# Patient Record
Sex: FEMALE | Race: WHITE | Employment: OTHER | URBAN - METROPOLITAN AREA
[De-identification: names, ages, dates, MRNs, and addresses within clinical notes are randomized per-mention and may not be internally consistent; named-entity substitution may affect disease eponyms.]

---

## 2017-08-14 ENCOUNTER — HOSPITAL ENCOUNTER (OUTPATIENT)
Dept: GENERAL RADIOLOGY | Age: 82
Discharge: HOME | End: 2017-08-14
Attending: INTERNAL MEDICINE | Admitting: INTERNAL MEDICINE

## 2017-08-14 ENCOUNTER — OFFICE VISIT (OUTPATIENT)
Age: 82
End: 2017-08-14

## 2017-08-14 VITALS
HEIGHT: 60 IN | DIASTOLIC BLOOD PRESSURE: 68 MMHG | SYSTOLIC BLOOD PRESSURE: 134 MMHG | BODY MASS INDEX: 21.09 KG/M2 | WEIGHT: 107.4 LBS

## 2017-08-14 DIAGNOSIS — E55.9 VITAMIN D DEFICIENCY: Chronic | ICD-10-CM

## 2017-08-14 DIAGNOSIS — N18.3 CHRONIC KIDNEY DISEASE (CKD), STAGE 3 (MODERATE): Chronic | ICD-10-CM

## 2017-08-14 DIAGNOSIS — Z51.81 MEDICATION MONITORING ENCOUNTER: Chronic | ICD-10-CM

## 2017-08-14 DIAGNOSIS — M81.0 OSTEOPOROSIS, POSTMENOPAUSAL: Chronic | ICD-10-CM

## 2017-08-14 DIAGNOSIS — M81.0 OSTEOPOROSIS, POSTMENOPAUSAL: Primary | Chronic | ICD-10-CM

## 2017-08-14 PROCEDURE — 77080 DXA BONE DENSITY AXIAL: CPT | Performed by: INTERNAL MEDICINE

## 2017-08-14 PROCEDURE — 99214 OFFICE O/P EST MOD 30 MIN: CPT | Performed by: INTERNAL MEDICINE

## 2017-08-14 RX ORDER — ALENDRONATE SODIUM 70 MG/1
70 TABLET ORAL WEEKLY
Qty: 12 TABLET | Refills: 4 | Status: SHIPPED | OUTPATIENT
Start: 2017-08-14 | End: 2018-08-04 | Stop reason: SDUPTHER

## 2017-08-23 ENCOUNTER — PROCEDURE VISIT (OUTPATIENT)
Age: 82
End: 2017-08-23

## 2017-08-23 DIAGNOSIS — M81.0 OSTEOPOROSIS, POSTMENOPAUSAL: Primary | Chronic | ICD-10-CM

## 2017-10-30 ENCOUNTER — TELEPHONE (OUTPATIENT)
Dept: ENDOCRINOLOGY | Age: 82
End: 2017-10-30

## 2018-08-06 RX ORDER — ALENDRONATE SODIUM 70 MG/1
TABLET ORAL
Qty: 4 TABLET | Refills: 3 | Status: SHIPPED | OUTPATIENT
Start: 2018-08-06 | End: 2018-08-27 | Stop reason: SDUPTHER

## 2018-08-27 ENCOUNTER — OFFICE VISIT (OUTPATIENT)
Age: 83
End: 2018-08-27

## 2018-08-27 ENCOUNTER — PROCEDURE VISIT (OUTPATIENT)
Age: 83
End: 2018-08-27

## 2018-08-27 ENCOUNTER — HOSPITAL ENCOUNTER (OUTPATIENT)
Dept: GENERAL RADIOLOGY | Age: 83
Discharge: HOME | End: 2018-08-27
Payer: MEDICARE

## 2018-08-27 VITALS
SYSTOLIC BLOOD PRESSURE: 115 MMHG | HEIGHT: 60 IN | BODY MASS INDEX: 21.68 KG/M2 | DIASTOLIC BLOOD PRESSURE: 63 MMHG | WEIGHT: 110.4 LBS

## 2018-08-27 DIAGNOSIS — N18.30 STAGE 3 CHRONIC KIDNEY DISEASE (HCC): Chronic | ICD-10-CM

## 2018-08-27 DIAGNOSIS — M81.0 OSTEOPOROSIS, POSTMENOPAUSAL: Primary | Chronic | ICD-10-CM

## 2018-08-27 DIAGNOSIS — E55.9 VITAMIN D DEFICIENCY: Chronic | ICD-10-CM

## 2018-08-27 DIAGNOSIS — Z51.81 MEDICATION MONITORING ENCOUNTER: Chronic | ICD-10-CM

## 2018-08-27 DIAGNOSIS — M81.0 OSTEOPOROSIS, POSTMENOPAUSAL: Chronic | ICD-10-CM

## 2018-08-27 PROCEDURE — 77080 DXA BONE DENSITY AXIAL: CPT

## 2018-08-27 PROCEDURE — 77080 DXA BONE DENSITY AXIAL: CPT | Performed by: INTERNAL MEDICINE

## 2018-08-27 PROCEDURE — 99214 OFFICE O/P EST MOD 30 MIN: CPT | Performed by: INTERNAL MEDICINE

## 2018-08-27 RX ORDER — AMOXICILLIN 500 MG/1
500 CAPSULE ORAL 3 TIMES DAILY
COMMUNITY
End: 2020-04-22

## 2018-08-27 RX ORDER — TRIAMTERENE AND HYDROCHLOROTHIAZIDE 37.5; 25 MG/1; MG/1
1 TABLET ORAL DAILY
COMMUNITY
End: 2020-05-03

## 2018-08-27 RX ORDER — ALENDRONATE SODIUM 70 MG/1
TABLET ORAL
Qty: 12 TABLET | Refills: 4 | Status: SHIPPED | OUTPATIENT
Start: 2018-08-27 | End: 2018-09-10 | Stop reason: SDUPTHER

## 2018-08-27 RX ORDER — AMLODIPINE BESYLATE 2.5 MG/1
2.5 TABLET ORAL PRN
COMMUNITY
End: 2020-04-22

## 2018-08-27 NOTE — PROGRESS NOTES
[facility] ([facility]) Osteoporosis and [address]

## 2018-08-27 NOTE — PROGRESS NOTES
details. Kasie Mercado MD, Director, [facility]) Osteoporosis and Bone Health Services    CC: Lonza Bence DO

## 2018-09-10 ENCOUNTER — TELEPHONE (OUTPATIENT)
Dept: ENDOCRINOLOGY | Age: 83
End: 2018-09-10

## 2018-09-10 RX ORDER — ALENDRONATE SODIUM 70 MG/1
TABLET ORAL
Qty: 12 TABLET | Refills: 4 | Status: SHIPPED | OUTPATIENT
Start: 2018-09-10 | End: 2019-09-03 | Stop reason: SDUPTHER

## 2019-09-03 ENCOUNTER — HOSPITAL ENCOUNTER (OUTPATIENT)
Dept: GENERAL RADIOLOGY | Age: 84
Discharge: HOME | End: 2019-09-03
Payer: MEDICARE

## 2019-09-03 ENCOUNTER — PROCEDURE VISIT (OUTPATIENT)
Dept: ENDOCRINOLOGY | Age: 84
End: 2019-09-03
Payer: MEDICARE

## 2019-09-03 ENCOUNTER — OFFICE VISIT (OUTPATIENT)
Dept: ENDOCRINOLOGY | Age: 84
End: 2019-09-03
Payer: MEDICARE

## 2019-09-03 VITALS
SYSTOLIC BLOOD PRESSURE: 130 MMHG | DIASTOLIC BLOOD PRESSURE: 63 MMHG | BODY MASS INDEX: 22.54 KG/M2 | WEIGHT: 114.8 LBS | HEIGHT: 60 IN

## 2019-09-03 DIAGNOSIS — Z51.81 MEDICATION MONITORING ENCOUNTER: ICD-10-CM

## 2019-09-03 DIAGNOSIS — N18.30 STAGE 3 CHRONIC KIDNEY DISEASE (HCC): ICD-10-CM

## 2019-09-03 DIAGNOSIS — E55.9 VITAMIN D DEFICIENCY: ICD-10-CM

## 2019-09-03 DIAGNOSIS — M81.0 OSTEOPOROSIS, POSTMENOPAUSAL: Primary | ICD-10-CM

## 2019-09-03 DIAGNOSIS — M81.0 OSTEOPOROSIS, POSTMENOPAUSAL: Chronic | ICD-10-CM

## 2019-09-03 DIAGNOSIS — M81.0 OSTEOPOROSIS, POSTMENOPAUSAL: ICD-10-CM

## 2019-09-03 PROCEDURE — 77080 DXA BONE DENSITY AXIAL: CPT

## 2019-09-03 PROCEDURE — 77080 DXA BONE DENSITY AXIAL: CPT | Performed by: INTERNAL MEDICINE

## 2019-09-03 PROCEDURE — 99214 OFFICE O/P EST MOD 30 MIN: CPT | Performed by: INTERNAL MEDICINE

## 2019-09-03 RX ORDER — ALENDRONATE SODIUM 70 MG/1
TABLET ORAL
Qty: 12 TABLET | Refills: 4 | Status: SHIPPED | OUTPATIENT
Start: 2019-09-03 | End: 2020-04-22

## 2020-04-22 ENCOUNTER — OFFICE VISIT (OUTPATIENT)
Dept: ORTHOPEDIC SURGERY | Age: 85
End: 2020-04-22
Payer: MEDICARE

## 2020-04-22 VITALS
BODY MASS INDEX: 20.98 KG/M2 | SYSTOLIC BLOOD PRESSURE: 120 MMHG | DIASTOLIC BLOOD PRESSURE: 60 MMHG | WEIGHT: 114 LBS | HEART RATE: 65 BPM | HEIGHT: 62 IN

## 2020-04-22 PROCEDURE — 99204 OFFICE O/P NEW MOD 45 MIN: CPT | Performed by: PHYSICIAN ASSISTANT

## 2020-04-22 RX ORDER — TIZANIDINE 4 MG/1
4 TABLET ORAL NIGHTLY
Qty: 30 TABLET | Refills: 0 | Status: SHIPPED | OUTPATIENT
Start: 2020-04-22 | End: 2020-05-22

## 2020-04-22 NOTE — PROGRESS NOTES
New Patient: SPINE    Referring Provider:  No ref. provider found    Chief Complaint   Patient presents with    Lower Back Pain     OP/SP, LSP.  LOV 5/2/16    Leg Pain     Bilateral leg pain, today right more than left    Hip Pain     Bilateral hip pain, today right more than left       HISTORY OF PRESENT ILLNESS:      · The patient is being sent at the request of No ref. provider found in consultation as a new spine patient for low back pain and bilateral leg pain. The patient is a 81 y.o. female whom reports symptoms for 3 weeks. Symptoms progressed over the last  10 days. Patient reports there was not a significant event to cause the symptoms. Today discomfort is report at 4 out of 10, describing it as aching. Symptoms are aggravated by: lying down, sleeping. Patient has not undergone recent treatment. Patient denies previous lumbar spine surgery. · The patient presents today as a old patient with the same problem of lower back and bilateral leg pain. The patient was last seen in our office on 5/2/2016. The patient describes that her pain is in the lower back and radiates down the bilateral legs into the calf. This is been continually worsening over the past 3 weeks and she notices that a majority of her pain is worse at night which is something that she has not ever experienced before she has had ongoing lower back and leg symptoms for a number of years. The patient has tried some physical therapy but she has not attempted any other medications. She does have Tylenol at home that she would like to start taking during the day. Pain Assessment  Location of Pain: Back(LSP)  Location Modifiers: Posterior, Left, Right(Legs/Hips)  Severity of Pain: 4  Quality of Pain: Aching  Duration of Pain: Persistent  Frequency of Pain: Intermittent  Aggravating Factors: Other (Comment)(Lying down; sleeping)  Limiting Behavior: Yes  Relieving Factors:  Other (Comment)(Movement)  Result of Injury: No  Work-Related Injury: No  Are there other pain locations you wish to document?: No      Associated signs and symptoms:   Neurogenic bowel or bladder symptoms:  no   Perceived weakness:  no   Difficulty walking:  no    Recent Imaging (within past one year)   Xrays: no   MRI or CT of spine: no    Current/Past Treatment:   · Physical Therapy:  none  · Chiropractic:  none  · Injection:  none  · Medications:   NSAIDS:  none   Muscle relaxer:  none   Steriods:  none   Neuropathic medications:  none   Opioids:  none  · Previous surgery:  no  · Previous surgical consult:  no  · Other:  · Infection control  · Tested positive for MRSA in past 12 months:  no  · Tested positive for MSSA \"staph infection\" in past 12 months: no  · Tested positive for VRE (Vancomycin Resistant Enterococci) in past 12 months:   no  · Currently on any antibiotics for an infection: no  · Anticoagulants:  · On a blood thinner:  yes ASA  · Any history of bleeding disorder: no   · MRI Contraindication: no   · Previous Pain Management: yes   · Goal for treatment: Continue to walk  · How long can you stand? No limitations    Sit? No limitations       Walk? 2-3 miles      Past medical, surgical, social and family history reviewed with the patient.      Past Medical History:   Past Medical History:   Diagnosis Date    Acid reflux     High blood pressure     Lactose intolerance     Mitral valve prolapse     Seasonal allergies       Past Surgical History:     Past Surgical History:   Procedure Laterality Date    GALLBLADDER SURGERY      HYSTERECTOMY  1994    OTHER SURGICAL HISTORY      Uterine mess placement    UTERINE FIBROID SURGERY       Current Medications:     Current Outpatient Medications:     UNABLE TO FIND, Antistamine 10mg, Disp: , Rfl:     tiZANidine (ZANAFLEX) 4 MG tablet, Take 1 tablet by mouth nightly, Disp: 30 tablet, Rfl: 0    triamterene-hydrochlorothiazide (MAXZIDE-25) 37.5-25 MG per tablet, Take 1 tablet by mouth daily, Disp: , Rfl:    Probiotic Product (ACIDOPHILUS PROBIOTIC) CAPS capsule, Take 1 capsule by mouth daily. , Disp: , Rfl:     omeprazole (PRILOSEC) 10 MG capsule, Take 10 mg by mouth daily. , Disp: , Rfl:     aspirin 81 MG chewable tablet, Take 81 mg by mouth daily. , Disp: , Rfl:     Calcium Carbonate-Vit D-Min (CALCIUM 1200) 5636-8360 MG-UNIT CHEW, Take  by mouth daily. , Disp: , Rfl:     Vitamin D (CHOLECALCIFEROL) 1000 UNITS CAPS capsule, Take 1,000 Units by mouth daily. , Disp: , Rfl:   Allergies:  Lactose intolerance (gi) [lactose]; Morphine and related [codeine]; and Other  Social History:    reports that she has never smoked. She has never used smokeless tobacco. She reports that she does not drink alcohol or use drugs. Family History:   Family History   Problem Relation Age of Onset    No Known Problems Mother     No Known Problems Father     No Known Problems Sister          REVIEW OF SYSTEMS: ROS - 14 point    Constitutional: No fevers, chills, night sweats, unexplained weight loss  Eye: No vision changes or diplopia  ENT: No nasal congestion, postnasal drip or sore throat. No tinnitus  Respiratory: No cough or SOB  CV: No chest pain or palpitations  GI: No nausea, abdominal pain, stool changes  : No dysuria or hematuria  Skin: No new or changing skin lesions, no rashes  MSK: No joint swelling, morning stiffness, unusual joint pain  Neurological: No headache, confusion, syncope  Psychiatric: No excessive anxiety or depression  Endocrine: No polyuria or polydipsia  Hematologic: No lymph node enlargement or excessive bleeding  Immunologic:No history of immune deficiency or immunomodulating drugs         PHYSICAL EXAM:    Vitals: Blood pressure 120/60, pulse 65, height 5' 1.5\" (1.562 m), weight 114 lb (51.7 kg). GENERAL EXAM:  · General Apparence: Patient is adequately groomed with no evidence of malnutrition. · Psychiatric: Orientation: The patient is oriented to time, place and person.  The patient's mood and affect are appropriate   · Vascular: Examination reveals no swelling and palpation reveals no tenderness in upper or lower extremities. Good capillary refill. · The lymphatic examination of the neck, axillae and groin reveals all areas to be without enlargement or induration   Sensation is intact without deficit in the upper and lower extremities to light touch and pinprick  · Coordination of the upper and lower extremities are normal.    CERVICAL EXAMINATION:  · Inspection: Local inspection shows no step-off or bruising. Cervical alignment is normal. No instability is noted. · Palpation and Percussion: No evidence of tenderness at the midline. Paraspinal tenderness is not present. There is no paraspinal spasm. · Range of Motion:  pain-free ROM   · Strength: 5/5 bilateral upper extremities  · Special Tests:   Spurling's and Gallardo's are negative bilaterally. Perez and Impingement tests are negative bilaterally. · Skin:There are no rashes, ulcerations or lesions. · Reflexes: Bilaterally triceps, biceps and brachioradialis are 2+. Clonus absent bilaterally at the feet. No pathological reflexes are noted. · Gait & station:  normal, patient ambulates without assistance and no ataxia  · Additional Examinations:  · RIGHT UPPER EXTREMITY:  Inspection/examination of the right upper extremity does not show any tenderness, deformity or injury. Range of motion is normal and pain-free. There is no gross instability. There are no rashes, ulcerations or lesions. Strength and tone are normal. No atrophy or abnormal movements are noted. · LEFT UPPER EXTREMITY: Inspection/examination of the left upper extremity does not show any tenderness, deformity or injury. Range of motion is normal and pain-free. There is no gross instability. There are no rashes, ulcerations or lesions. Strength and tone are normal. No atrophy or abnormal movements are noted.     LUMBAR/SACRAL EXAMINATION:  · Inspection: Local inspection Health       This dictation was performed with a verbal recognition program [facility]) and it was checked for errors. It is possible that there are still dictated errors within this office note. If so, please bring any errors to my attention for an addendum. All efforts were made to ensure that this office note is accurate.

## 2020-05-03 ENCOUNTER — HOSPITAL ENCOUNTER (EMERGENCY)
Age: 85
Discharge: HOME | End: 2020-05-03
Attending: EMERGENCY MEDICINE
Payer: MEDICARE

## 2020-05-03 VITALS
WEIGHT: 115 LBS | OXYGEN SATURATION: 97 % | HEART RATE: 59 BPM | HEIGHT: 60 IN | DIASTOLIC BLOOD PRESSURE: 52 MMHG | TEMPERATURE: 98.2 F | SYSTOLIC BLOOD PRESSURE: 138 MMHG | RESPIRATION RATE: 16 BRPM | BODY MASS INDEX: 22.58 KG/M2

## 2020-05-03 LAB
ALBUMIN SERPL-MCNC: 4 G/DL (ref 3.4–5)
ALP BLD-CCNC: 61 U/L (ref 40–129)
ALT SERPL-CCNC: 25 U/L (ref 10–40)
ANION GAP SERPL CALCULATED.3IONS-SCNC: 13 MMOL/L (ref 3–16)
AST SERPL-CCNC: 31 U/L (ref 15–37)
BASOPHILS ABSOLUTE: 0 K/UL (ref 0–0.2)
BASOPHILS RELATIVE PERCENT: 0.3 %
BILIRUB SERPL-MCNC: 0.3 MG/DL (ref 0–1)
BILIRUBIN DIRECT: <0.2 MG/DL (ref 0–0.3)
BILIRUBIN URINE: NEGATIVE
BILIRUBIN, INDIRECT: NORMAL MG/DL (ref 0–1)
BLOOD, URINE: NEGATIVE
BUN BLDV-MCNC: 17 MG/DL (ref 7–20)
CALCIUM SERPL-MCNC: 9.6 MG/DL (ref 8.3–10.6)
CHLORIDE BLD-SCNC: 104 MMOL/L (ref 99–110)
CLARITY: CLEAR
CO2: 23 MMOL/L (ref 21–32)
COLOR: YELLOW
CREAT SERPL-MCNC: 0.9 MG/DL (ref 0.6–1.2)
EOSINOPHILS ABSOLUTE: 0.1 K/UL (ref 0–0.6)
EOSINOPHILS RELATIVE PERCENT: 1.5 %
GFR AFRICAN AMERICAN: >60
GFR NON-AFRICAN AMERICAN: 60
GLUCOSE BLD-MCNC: 139 MG/DL (ref 70–99)
GLUCOSE URINE: NEGATIVE MG/DL
HCT VFR BLD CALC: 38.6 % (ref 36–48)
HEMOGLOBIN: 13.1 G/DL (ref 12–16)
KETONES, URINE: NEGATIVE MG/DL
LEUKOCYTE ESTERASE, URINE: ABNORMAL
LIPASE: 80 U/L (ref 13–60)
LYMPHOCYTES ABSOLUTE: 1.8 K/UL (ref 1–5.1)
LYMPHOCYTES RELATIVE PERCENT: 30 %
MCH RBC QN AUTO: 31.5 PG (ref 26–34)
MCHC RBC AUTO-ENTMCNC: 33.8 G/DL (ref 31–36)
MCV RBC AUTO: 93.2 FL (ref 80–100)
MICROSCOPIC EXAMINATION: YES
MONOCYTES ABSOLUTE: 0.5 K/UL (ref 0–1.3)
MONOCYTES RELATIVE PERCENT: 8.3 %
NEUTROPHILS ABSOLUTE: 3.7 K/UL (ref 1.7–7.7)
NEUTROPHILS RELATIVE PERCENT: 59.9 %
NITRITE, URINE: NEGATIVE
PDW BLD-RTO: 13.4 % (ref 12.4–15.4)
PH UA: 6 (ref 5–8)
PLATELET # BLD: 220 K/UL (ref 135–450)
PMV BLD AUTO: 8.6 FL (ref 5–10.5)
POTASSIUM REFLEX MAGNESIUM: 4 MMOL/L (ref 3.5–5.1)
PROTEIN UA: NEGATIVE MG/DL
RBC # BLD: 4.14 M/UL (ref 4–5.2)
RBC UA: NORMAL /HPF (ref 0–4)
SODIUM BLD-SCNC: 140 MMOL/L (ref 136–145)
SPECIFIC GRAVITY UA: 1.02 (ref 1–1.03)
TOTAL PROTEIN: 7 G/DL (ref 6.4–8.2)
URINE TYPE: ABNORMAL
UROBILINOGEN, URINE: 0.2 E.U./DL
WBC # BLD: 6.1 K/UL (ref 4–11)
WBC UA: NORMAL /HPF (ref 0–5)

## 2020-05-03 PROCEDURE — 80048 BASIC METABOLIC PNL TOTAL CA: CPT

## 2020-05-03 PROCEDURE — 85025 COMPLETE CBC W/AUTO DIFF WBC: CPT

## 2020-05-03 PROCEDURE — 99284 EMERGENCY DEPT VISIT MOD MDM: CPT

## 2020-05-03 PROCEDURE — 36415 COLL VENOUS BLD VENIPUNCTURE: CPT

## 2020-05-03 PROCEDURE — 80076 HEPATIC FUNCTION PANEL: CPT

## 2020-05-03 PROCEDURE — 81001 URINALYSIS AUTO W/SCOPE: CPT

## 2020-05-03 PROCEDURE — 83690 ASSAY OF LIPASE: CPT

## 2020-05-03 RX ORDER — IRBESARTAN 150 MG/1
150 TABLET ORAL DAILY
COMMUNITY

## 2020-05-03 RX ORDER — DICYCLOMINE HCL 20 MG
20 TABLET ORAL 3 TIMES DAILY PRN
Qty: 30 TABLET | Refills: 0 | Status: SHIPPED | OUTPATIENT
Start: 2020-05-03 | End: 2020-05-13

## 2020-05-03 ASSESSMENT — ENCOUNTER SYMPTOMS
DIARRHEA: 0
VOMITING: 0
NAUSEA: 0
CONSTIPATION: 0
BLOOD IN STOOL: 0
SHORTNESS OF BREATH: 0
ABDOMINAL PAIN: 1

## 2020-05-03 ASSESSMENT — PAIN SCALES - GENERAL: PAINLEVEL_OUTOF10: 6

## 2020-05-03 ASSESSMENT — PAIN DESCRIPTION - LOCATION: LOCATION: ABDOMEN

## 2020-05-03 ASSESSMENT — PAIN DESCRIPTION - PAIN TYPE: TYPE: ACUTE PAIN

## 2020-05-03 ASSESSMENT — PAIN DESCRIPTION - DESCRIPTORS: DESCRIPTORS: SHARP;SHOOTING

## 2020-05-03 ASSESSMENT — PAIN DESCRIPTION - ORIENTATION: ORIENTATION: LEFT;LOWER

## 2020-05-03 NOTE — ED PROVIDER NOTES
MG CAPSULE    Take 10 mg by mouth daily. PROBIOTIC PRODUCT (ACIDOPHILUS PROBIOTIC) CAPS CAPSULE    Take 1 capsule by mouth daily. TIZANIDINE (ZANAFLEX) 4 MG TABLET    Take 1 tablet by mouth nightly    UNABLE TO FIND    Antistamine 10mg    VITAMIN D (CHOLECALCIFEROL) 1000 UNITS CAPS CAPSULE    Take 1,000 Units by mouth daily. Allergies     She is allergic to demerol hcl [meperidine]; lactose intolerance (gi) [lactose]; morphine and related [codeine]; other; and prednisone. Physical Exam     INITIAL VITALS: BP: (!) 158/76, Temp: 98.2 °F (36.8 °C), Pulse: 59, Resp: 16, SpO2: 96 %    General: 81 y.o. female in no apparent distress, well developed, well nourished, non-toxic appearance. HEENT: Atraumatic, normocephalic. EOMs intact. Neck:  Full range of motion. Chest/pulm: Respiratory rate normal. Speaks in complete sentences, no respiratory distress, lungs CTA bilaterally, no wheezes, rales, rhonchi. Cardiovascular: Heart rate normal. RRR, no murmurs, rubs, gallops appreciated. Abdomen: No gross distension. Soft, mild tenderness to palpation of left lower quadrant without rebound or guarding. No CVAT. : Deferred. Musculoskeletal: Ambulates without difficulty. No evident long bone or joint deformity. Negative straight leg raise bilaterally. No lower extremity edema. Neuro: A&O x 4. Normal speech without dysarthria or aphasia. Moves all extremities spontaneously and symmetrically. Movements normal without ataxia. Skin: Warm, dry. No obvious rashes, petechiae, or purpura. Psych: Appropriate mood and affect, normal interaction.      Diagnostic Results     RADIOLOGY:  No orders to display       LABS:   Results for orders placed or performed during the hospital encounter of 05/03/20   CBC Auto Differential   Result Value Ref Range    WBC 6.1 4.0 - 11.0 K/uL    RBC 4.14 4.00 - 5.20 M/uL    Hemoglobin 13.1 12.0 - 16.0 g/dL    Hematocrit 38.6 36.0 - 48.0 %    MCV 0 - 4 /HPF         RECENT VITALS:  BP: (!) 138/52, Temp: 98.2 °F (36.8 °C), Pulse: 59, Resp: 16, SpO2: 97 %       ED Course     Nursing Notes, Past Medical Hx,Past Surgical Hx, Social Hx, Allergies, and Family Hx were reviewed. The patient was given the following medications:  Orders Placed This Encounter   Medications    dicyclomine (BENTYL) 20 MG tablet     Sig: Take 1 tablet by mouth 3 times daily as needed (abdominal pain)     Dispense:  30 tablet     Refill:  0       CONSULTS:  None    MEDICAL DECISION MAKING / ASSESSMENT / Nita Kierra is a 81 y.o. female presenting with LLQ pain. On presentation, patient is well-appearing and in no acute distress. Patient is afebrile with stable VS.    No leukocytosis, anemia, electrolyte abnormality, renal dysfunction, or hepatic dysfunction noted. Lipase of 80 however this is not consistent with acute pancreatitis. Urinalysis negative for UTI. Patient is overall clinically well-appearing with a benign abdominal exam.  Diverticulitis is on the differential however I have very low suspicion for this that she has unremarkable labs, a benign belly, and denies any hematochezia. Patient has had hysterectomy and bilateral oophorectomy which makes a gynecologic source of her symptoms unlikely. I have low suspicion for any acute intra-abdominal pathology at this time. Patient does not merit any further evaluation or management in the emergency department at this time. Patient's symptoms appear musculoskeletal in nature. Patient requested something for pain so I provided her with a prescription for Bentyl to help alleviate her discomfort. I also included exercises to help strengthen her core. I instructed that she should follow-up with her primary care provider as she may benefit from physical therapy if her symptoms persist.    At this point in time, patient is stable for discharge.  Patient given strict return precautions as outlined in the AVS. Patient was agreeable and understanding to this plan of care. Prior to discharge, patient was ambulatory and PO tolerant. This patient was also evaluated by the attending physician. All care plans were discussed and agreed upon. Clinical Impression     1. Left lower quadrant abdominal pain    2.  Strain of abdominal muscle, initial encounter        Disposition     PATIENT REFERRED TO:  [id number]  [facility] [address] [phone]            DISCHARGE MEDICATIONS:  New Prescriptions    DICYCLOMINE (BENTYL) 20 MG TABLET    Take 1 tablet by mouth 3 times daily as needed (abdominal pain)       DISPOSITION Decision To Discharge 05/03/2020 03:54:47 PM       Ne Daniel PA-C  05/03/20 [id number]

## 2020-05-03 NOTE — ED NOTES
Patient discharged to home with all belongings. All discharge and follow up information discussed. Patient verbalized understanding and denies needs or questions. Patient is alert, oriented x4, no signs of acute distress.       John Hayes RN  05/03/20 [id number]

## 2020-09-15 ENCOUNTER — PROCEDURE VISIT (OUTPATIENT)
Dept: ENDOCRINOLOGY | Age: 85
End: 2020-09-15

## 2020-09-15 ENCOUNTER — HOSPITAL ENCOUNTER (OUTPATIENT)
Dept: GENERAL RADIOLOGY | Age: 85
Discharge: HOME | End: 2020-09-15
Payer: MEDICARE

## 2020-09-15 ENCOUNTER — OFFICE VISIT (OUTPATIENT)
Dept: ENDOCRINOLOGY | Age: 85
End: 2020-09-15
Payer: MEDICARE

## 2020-09-15 VITALS
WEIGHT: 109 LBS | RESPIRATION RATE: 14 BRPM | HEART RATE: 57 BPM | HEIGHT: 60 IN | SYSTOLIC BLOOD PRESSURE: 152 MMHG | DIASTOLIC BLOOD PRESSURE: 72 MMHG | OXYGEN SATURATION: 98 % | BODY MASS INDEX: 21.4 KG/M2

## 2020-09-15 PROCEDURE — 77080 DXA BONE DENSITY AXIAL: CPT | Performed by: INTERNAL MEDICINE

## 2020-09-15 PROCEDURE — 99214 OFFICE O/P EST MOD 30 MIN: CPT | Performed by: INTERNAL MEDICINE

## 2020-09-15 PROCEDURE — 77080 DXA BONE DENSITY AXIAL: CPT

## 2020-09-15 RX ORDER — ALENDRONATE SODIUM 70 MG/1
70 TABLET ORAL
COMMUNITY
End: 2020-09-15 | Stop reason: SDUPTHER

## 2020-09-15 RX ORDER — MECLIZINE HCL 12.5 MG/1
12.5 TABLET ORAL 3 TIMES DAILY PRN
COMMUNITY
Start: 2020-08-17 | End: 2020-09-15 | Stop reason: ALTCHOICE

## 2020-09-15 RX ORDER — ONDANSETRON 4 MG/1
TABLET, FILM COATED ORAL
COMMUNITY
Start: 2020-08-17 | End: 2020-09-15 | Stop reason: ALTCHOICE

## 2020-09-15 RX ORDER — ALENDRONATE SODIUM 70 MG/1
70 TABLET ORAL
Qty: 4 TABLET | Refills: 4 | Status: SHIPPED | OUTPATIENT
Start: 2020-09-15 | End: 2021-05-20

## 2020-09-15 NOTE — PROGRESS NOTES
[facility]) Osteoporosis and [address]   Phone [phone]  Fax [phone]    PATIENT NAME: Noy Chamberlain OF BIRTH: 1935  INITIAL CONSULTATION: 06/09/2010  LAST OFFICE VISIT: 09/03/2019  TODAY'S VISIT: 09/15/2020    Labs @ [facility] 05/2020    PROBLEMS:   Osteoporosis by DXA, lowest T-score -2.5 in the spine 02/2010    Family history of osteoporosis (brother fractured hip)    BMD decreased 9332-4696  Vitamin D deficiency, desirable 25-OH D is 30 to 60 ng/mL    25 ng/mL 04/2010    67 ng/mL 06/2010 with ergocalciferol 50,000 IU weekly    42 ng/mL 10/2010 with cholecalciferol 2000 IU daily    36 ng/mL 10/2014 with cholecalciferol 2000 IU daily    76 ng/mL 05/2016    35 ng/mL 05/2020  Reduced kidney function, 03/2017 Cr 1.1 GFR 45. 06/2019 Cr 1.1 GFR 46. Surgical menopause 12 (age 48)  Bai's esophagus/GERD  Scoliosis  Osteoarthritis    CURRENT MANAGEMENT FOR BONE HEALTH:   Calcium:  900 mg diet + 600 mg/d supplement = 1500 mg/d  Vitamin D:  1000 IU 3 x weekly + 500 IU/d with calcium supplement   Exercise:  works in her garden and tries to walk daily  Pharmacologic therapy:  alendronate 70 mg weekly 11/2011-08/2016, stopped for a drug holiday, restarted 08/2017    PREVIOUS MEDICATIONS FOR OSTEOPOROSIS: Estrogen 2901-7370    OTHER CURRENT MEDICATIONS (SELECTED): Welchol    CHIEF COMPLAINT: Here for f/u visit of osteoporosis and vitamin D deficiency, monitoring treatment. No new related signs or symptoms. PAST MEDICAL HISTORY, FAMILY HISTORY, SOCIAL HISTORY AND REVIEW OF SYSTEMS:  Relevant changes since last visit (see patient questionnaire of todays date). INTERVAL HISTORY. See problem list for active/ongoing issues. She has been taking alendronate correctly and without side effects. No falls, near falls or fractures. She feels well overall. NEUROLOGIC EXAM: Able to rise from chair without using arms.   No apparent focal motor or sensory deficit. Reflexes brisk and symmetric. Coordination appears normal.  MUSCULOSKELETAL EXAM: Gait: Intact without difficulty. Steady without assistance. Spine: Scoliosis. No spine tenderness to palpation or percussion. Ribs and pelvis: Ribs appear normal. Two finger spaces between ribs and pelvis. BONE DENSITY: Most recent done here using Hologic equipment. T-scores  Initial study: 09/23/2003 L1-L4 -2.2 (left fem. neck) -1.9   Current study: 09/15/2020 L2-L3 -3.1 (left fem. neck) -2.2     The table below shows bone mineral density (grams/cm2), the appropriate measure for comparing serial scans. An increase or decrease is significant based on precision studies done at our center according to the ISCD protocol. PA spine Proximal Femur (left)   Date L2-L3 Fem. neck Trochanter Total hip   09/23/2003 --- 0.642 0.562 0.791   08/06/2010 --- 0.631 --- 0.798   11/14/2011 --- 0.579 0.452 0.680   12/04/2012 0.768 0.623 0.465 0.692   01/06/2015 0.752 0.607 0.485 0.713   07/26/2016 0.734 0.613 0.470 0.713   08/14/2017 0.683 0.606 0.468 0.700   08/27/2018 0.717 0.588 0.500 0.722   09/03/2019 0.682 0.603 0.475 0.700   09/15/2020 0.714 0.606 0.468 0.707     IMPRESSION:  BONE DENSITY IS LOW, CONSISTENT WITH OSTEOPOROSIS. SINCE THE PREVIOUS DXA, BMD DID NOT CHANGE SIGNIFICANTLY IN THE SPINE OR LEFT HIP. LABS. 05/2015 Ca 9.6 Cr 1.0 GFR 54.  04/2016 Ca 10.0 Cr 1.1 GFR 46. 03/2017 Ca 10.4 Cr 1.1 GFR 46.  06/2019 Ca 10.0, Cr 1.1 GFR 46. 05/2020 Ca 9.6 Cr 0.9. IMAGING. DXA printouts reviewed. ASSESSMENT: Osteoporosis, bone density lower than desirable. She had a nice response to alendronate 6320-3572. BMD decreased in the spine 7937-8880, off treatment for a drug holiday.   She is doing well with alendronate re-started 08/2017. PLANS:   Continue treatment with alendronate 70 mg weekly. Return in 1 year with DXA. I spent 25 minutes face to face with this patient.  Over 50% of that time was spent on counseling and care coordination. See assessment and plan for counseling and care coordination details. Andrea Mercado MD, Director, [facility] ([facility]) Osteoporosis and Bone Health Services    CC: Milly Young DO

## 2020-09-15 NOTE — RESULT ENCOUNTER NOTE
[facility]) Osteoporosis and [address] [place] [address]   Phone [phone]  Fax [phone]    NAME: Sergo Tang   : 1935   STUDY DATE: 09/15/2020     REFERRING PROVIDER: Lisbet Tabares MD     INDICATION(S) FOR PERFORMING THE STUDY:  osteoporosis, age-related (M81.0)    CLINICAL INFORMATION PROVIDED BY THE PATIENT: 81-year-old woman. She underwent surgical menopause at age 48. She took estrogen until . No history of fragility fractures. No long-term corticosteroid use. She took alendronate 2011-2016, restarted 2017. EQUIPMENT: Hologic [employer]. POSITIONING: Good. REGIONS OF INTEREST: Correct. ARTIFACTS: None. STUDY VALID? Yes. Spine BMD is spuriously high because of generalized degenerative change. L1 and L4 were deleted due to T-score discrepancy. T-scores  Initial study: 2003 L1-L4 -2.2 (left fem. neck) -1.9   Current study: 09/15/2020 L2-L3 -3.1 (left fem. neck) -2.2     The table below shows bone mineral density (grams/cm2), the appropriate measure for comparing serial scans. An increase or decrease is significant based on precision studies done at our center according to the ISCD protocol. PA spine Proximal Femur (left)   Date L2-L3 Fem. neck Trochanter Total hip   2003 --- 0.642 0.562 0.791   2010 --- 0.631 --- 0.798   2011 --- 0.579 0.452 0.680   2012 0.768 0.623 0.465 0.692   2015 0.752 0.607 0.485 0.713   2016 0.734 0.613 0.470 0.713   2017 0.683 0.606 0.468 0.700   2018 0.717 0.588 0.500 0.722   2019 0.682 0.603 0.475 0.700   09/15/2020 0.714 0.606 0.468 0.707     IMPRESSION:  BONE DENSITY IS LOW, CONSISTENT WITH OSTEOPOROSIS. SINCE THE PREVIOUS DXA, BMD DID NOT CHANGE SIGNIFICANTLY IN THE SPINE OR LEFT HIP. Consider repeating this study in 1-2 years to assess the patient's progress.   _________________________________________________ Laquetta Prader Watts MD, Director, [facility]) Osteoporosis and [facility] [address]

## 2021-10-12 ENCOUNTER — PROCEDURE VISIT (OUTPATIENT)
Dept: ENDOCRINOLOGY | Age: 86
End: 2021-10-12

## 2021-10-12 ENCOUNTER — HOSPITAL ENCOUNTER (OUTPATIENT)
Dept: GENERAL RADIOLOGY | Age: 86
Discharge: HOME | End: 2021-10-12
Payer: MEDICARE

## 2021-10-12 ENCOUNTER — OFFICE VISIT (OUTPATIENT)
Dept: ENDOCRINOLOGY | Age: 86
End: 2021-10-12
Payer: MEDICARE

## 2021-10-12 VITALS
WEIGHT: 105.8 LBS | DIASTOLIC BLOOD PRESSURE: 64 MMHG | HEIGHT: 60 IN | BODY MASS INDEX: 20.77 KG/M2 | SYSTOLIC BLOOD PRESSURE: 122 MMHG

## 2021-10-12 DIAGNOSIS — M81.0 OSTEOPOROSIS, POSTMENOPAUSAL: Chronic | ICD-10-CM

## 2021-10-12 DIAGNOSIS — Z51.81 MEDICATION MONITORING ENCOUNTER: ICD-10-CM

## 2021-10-12 DIAGNOSIS — M81.0 OSTEOPOROSIS, POSTMENOPAUSAL: ICD-10-CM

## 2021-10-12 DIAGNOSIS — M81.0 OSTEOPOROSIS, POSTMENOPAUSAL: Primary | ICD-10-CM

## 2021-10-12 DIAGNOSIS — N18.30 STAGE 3 CHRONIC KIDNEY DISEASE, UNSPECIFIED WHETHER STAGE 3A OR 3B CKD (HCC): ICD-10-CM

## 2021-10-12 DIAGNOSIS — E55.9 VITAMIN D DEFICIENCY: ICD-10-CM

## 2021-10-12 PROCEDURE — 77080 DXA BONE DENSITY AXIAL: CPT | Performed by: INTERNAL MEDICINE

## 2021-10-12 PROCEDURE — 77080 DXA BONE DENSITY AXIAL: CPT

## 2021-10-12 PROCEDURE — 99214 OFFICE O/P EST MOD 30 MIN: CPT | Performed by: INTERNAL MEDICINE

## 2021-10-12 RX ORDER — SODIUM CHLORIDE 9 MG/ML
INJECTION, SOLUTION INTRAVENOUS CONTINUOUS
Status: CANCELLED | OUTPATIENT
Start: 2021-10-12

## 2021-10-12 RX ORDER — CETIRIZINE HYDROCHLORIDE 10 MG/1
10 TABLET ORAL NIGHTLY
COMMUNITY
Start: 2021-06-28

## 2021-10-12 RX ORDER — METHYLPREDNISOLONE SODIUM SUCCINATE 125 MG/2ML
125 INJECTION, POWDER, LYOPHILIZED, FOR SOLUTION INTRAMUSCULAR; INTRAVENOUS ONCE
Status: CANCELLED | OUTPATIENT
Start: 2021-10-12 | End: 2021-10-12

## 2021-10-12 RX ORDER — AMLODIPINE BESYLATE 5 MG/1
5 TABLET ORAL DAILY
COMMUNITY
Start: 2021-02-12

## 2021-10-12 RX ORDER — DIPHENHYDRAMINE HYDROCHLORIDE 50 MG/ML
50 INJECTION INTRAMUSCULAR; INTRAVENOUS ONCE
Status: CANCELLED | OUTPATIENT
Start: 2021-10-12 | End: 2021-10-12

## 2021-10-12 RX ORDER — SODIUM CHLORIDE 9 MG/ML
25 INJECTION, SOLUTION INTRAVENOUS PRN
Status: CANCELLED | OUTPATIENT
Start: 2021-10-12

## 2021-10-12 RX ORDER — SODIUM CHLORIDE 0.9 % (FLUSH) 0.9 %
5-40 SYRINGE (ML) INJECTION PRN
Status: CANCELLED | OUTPATIENT
Start: 2021-10-12

## 2021-10-12 RX ORDER — ZOLEDRONIC ACID 5 MG/100ML
5 INJECTION, SOLUTION INTRAVENOUS ONCE
Status: CANCELLED | OUTPATIENT
Start: 2021-10-12 | End: 2021-10-12

## 2021-10-12 RX ORDER — ALENDRONATE SODIUM 70 MG/1
70 TABLET ORAL
Qty: 12 TABLET | Refills: 2 | Status: SHIPPED | OUTPATIENT
Start: 2021-10-12 | End: 2022-07-11

## 2021-10-12 RX ORDER — MULTIVITAMIN WITH IRON
TABLET ORAL
COMMUNITY

## 2021-10-12 RX ORDER — EPINEPHRINE 1 MG/ML
0.3 INJECTION, SOLUTION, CONCENTRATE INTRAVENOUS PRN
Status: CANCELLED | OUTPATIENT
Start: 2021-10-12

## 2021-10-12 RX ORDER — HEPARIN SODIUM (PORCINE) LOCK FLUSH IV SOLN 100 UNIT/ML 100 UNIT/ML
500 SOLUTION INTRAVENOUS PRN
Status: CANCELLED | OUTPATIENT
Start: 2021-10-12

## 2021-10-12 RX ORDER — SODIUM CHLORIDE 9 MG/ML
INJECTION, SOLUTION INTRAVENOUS ONCE
Status: CANCELLED | OUTPATIENT
Start: 2021-10-12 | End: 2021-10-12

## 2021-10-12 NOTE — PROGRESS NOTES
[facility]) Osteoporosis and [address] [facility] [address]   Phone [phone]  Fax [phone]    NAME: Chavo Guillaume OF BIRTH: 1935  INITIAL CONSULTATION: 06/09/2010  LAST OFFICE VISIT: 09/15/2020  TODAY'S VISIT: 10/12/2021    Labs @ [facility] 06/2021    PROBLEMS:   Osteoporosis by DXA, lowest T-score -2.5 in the spine 02/2010    Family history of osteoporosis (brother fractured hip)    BMD decreased 9479-6530  Vitamin D deficiency, desirable 25-OH D is 30 to 60 ng/mL    25 ng/mL 04/2010    67 ng/mL 06/2010 with ergocalciferol 50,000 IU weekly    42 ng/mL 10/2010 with cholecalciferol 2000 IU daily    36 ng/mL 10/2014 with cholecalciferol 2000 IU daily    76 ng/mL 05/2016    35 ng/mL 05/2020  Reduced kidney function, 03/2017 Cr 1.1 GFR 45. 06/2019 Cr 1.1 GFR 46. 06/2021 Cr 1.0 GFR 54. Surgical menopause 12 (age 48)  Bai's esophagus/GERD  Scoliosis  Osteoarthritis    CURRENT MANAGEMENT FOR BONE HEALTH:   Calcium:  900 mg diet + 600 mg/d supplement = 1500 mg/d  Vitamin D:  1000 IU 3 x weekly + 800 IU/d with calcium supplement   Exercise:  works in her garden and tries to walk daily  Pharmacologic therapy:  alendronate 70 mg weekly 11/2011-08/2016, stopped for a holiday, restarted 08/2017    PREVIOUS MEDICATIONS FOR OSTEOPOROSIS: Estrogen 7871-7180    OTHER CURRENT MEDICATIONS (SELECTED): Welchol    CHIEF COMPLAINT: Here for f/u visit of osteoporosis and vitamin D deficiency, monitoring treatment. No new related signs or symptoms. PAST MEDICAL HISTORY, FAMILY HISTORY, SOCIAL HISTORY AND REVIEW OF SYSTEMS:  Relevant changes since last visit (see patient questionnaire of todays date). INTERVAL HISTORY. See problem list for active/ongoing issues. She has been taking alendronate correctly and without side effects. No falls, near falls or fractures. She feels well overall.      NEUROLOGIC EXAM: Able to rise from chair without using arms.  No apparent focal motor or sensory deficit. Reflexes brisk and symmetric. Coordination appears normal.  MUSCULOSKELETAL EXAM: Gait: Intact without difficulty. Steady without assistance. Spine: Scoliosis. No spine tenderness to palpation or percussion. Ribs and pelvis: Ribs appear normal. Two finger spaces between ribs and pelvis. BONE DENSITY: Most recent done here using Hologic equipment. T-scores  Initial study: 09/23/2003 L1-L4 -2.2 (left fem. neck) -1.9   Current study: 10/12/2021 L2-L3 -2.6 (left fem. neck) -2.4     The table below shows bone mineral density (grams/cm2), the appropriate measure for comparing serial scans. An increase or decrease is significant based on precision studies done at our center according to the ISCD protocol. PA spine Proximal Femur (left)   Date L2-L3 Fem. neck Trochanter Total hip   09/23/2003 --- 0.642 0.562 0.791   08/06/2010 --- 0.631 --- 0.798   11/14/2011 --- 0.579 0.452 0.680   12/04/2012 0.768 0.623 0.465 0.692   01/06/2015 0.752 0.607 0.485 0.713   07/26/2016 0.734 0.613 0.470 0.713   08/14/2017 0.683 0.606 0.468 0.700   08/27/2018 0.717 0.588 0.500 0.722   09/03/2019 0.682 0.603 0.475 0.700   09/15/2020 0.714 0.606 0.468 0.707   10/12/2021 0.769 0.585 0.436 0.682     IMPRESSION:  BONE DENSITY IS LOW, CONSISTENT WITH OSTEOPOROSIS. SINCE THE PREVIOUS DXA, BMD DID NOT CHANGE SIGNIFICANTLY IN THE SPINE OR LEFT HIP. LABS. 05/2015 Ca 9.6 Cr 1.0 GFR 54.  04/2016 Ca 10.0 Cr 1.1 GFR 46. 03/2017 Ca 10.4 Cr 1.1 GFR 46.  06/2019 Ca 10.0, Cr 1.1 GFR 46. 05/2020 Ca 9.6 Cr 0.9. 06/2021 Ca 10.0 Cr 1.0 GFR 54. IMAGING. DXA printouts reviewed. 05/2020 L hip XR. ASSESSMENT: Osteoporosis, bone density lower than desirable. She had a nice response to alendronate 4814-8657. BMD decreased in the spine 3488-2519, off treatment for a drug holiday.   She is doing well with alendronate re-started 08/2017.     PLANS:   Continue treatment with alendronate 70 mg weekly. Return in 1 year with DXA. Time spent today: 30-40 minutes. Miranda Mercado MD, Director, [facility]) Osteoporosis and Bone Health Services    CC: Aletha Valentine DO

## 2021-10-12 NOTE — RESULT ENCOUNTER NOTE
[facility]) Osteoporosis and [address] [facility] [address] [facility] [address] [facility], [address] [place] [address],[place] [address]  Phone [phone]  Fax [phone]    NAME: Rah Boss   : 1935   STUDY DATE: 10/12/2021     REFERRING PROVIDER: Evonne Post MD     INDICATION(S) FOR PERFORMING THE STUDY:  osteoporosis, age-related (M81.0)    CLINICAL INFORMATION PROVIDED BY THE PATIENT: 67-year-old woman. She underwent surgical menopause at age 48. She took estrogen until . No history of fragility fractures. No long-term corticosteroid use. She took alendronate 2011-2016, restarted 2017. EQUIPMENT: [employer]. POSITIONING: Good. REGIONS OF INTEREST: Correct. ARTIFACTS: None. STUDY VALID? Yes. Spine BMD is spuriously high because of generalized degenerative change. L1 and L4 were deleted due to T-score discrepancy. T-scores  Initial study: 2003 L1-L4 -2.2 (left fem. neck) -1.9   Current study: 10/12/2021 L2-L3 -2.6 (left fem. neck) -2.4     The table below shows bone mineral density (grams/cm2), the appropriate measure for comparing serial scans. An increase or decrease is significant based on precision studies done at our center according to the ISCD protocol. PA spine Proximal Femur (left)   Date L2-L3 Fem. neck Trochanter Total hip   2003 --- 0.642 0.562 0.791   2010 --- 0.631 --- 0.798   2011 --- 0.579 0.452 0.680   2012 0.768 0.623 0.465 0.692   2015 0.752 0.607 0.485 0.713   2016 0.734 0.613 0.470 0.713   2017 0.683 0.606 0.468 0.700   2018 0.717 0.588 0.500 0.722   2019 0.682 0.603 0.475 0.700   09/15/2020 0.714 0.606 0.468 0.707   10/12/2021 0.769 0.585 0.436 0.682     IMPRESSION:  BONE DENSITY IS LOW, CONSISTENT WITH OSTEOPOROSIS. SINCE THE PREVIOUS DXA, BMD DID NOT CHANGE SIGNIFICANTLY IN THE SPINE OR LEFT HIP.      Consider repeating this study in 1-2 years to assess the patient's progress. _________________________________________________   Ethel Mercado MD, Director, [facility]) Osteoporosis and [address] [place] [address]

## 2022-07-11 RX ORDER — ALENDRONATE SODIUM 70 MG/1
TABLET ORAL
Qty: 12 TABLET | Refills: 0 | Status: SHIPPED | OUTPATIENT
Start: 2022-07-11 | End: 2022-10-03

## 2022-07-11 NOTE — TELEPHONE ENCOUNTER
Medication:   Requested Prescriptions     Pending Prescriptions Disp Refills    alendronate (FOSAMAX) 70 MG tablet [Pharmacy Med Name: ALENDRONATE SODIUM 70 MG TAB] 12 tablet 2     Sig: TAKE 1 TABLET BY MOUTH ONE TIME PER WEEK       Last Filled:      Patient Phone Number: [phone] (home) [phone] (work)    Last appt: 10/12/2021   Next appt: 10/25/2022    Last Labs DM: No results found for: LABA1C  Last Lipid:   Lab Results   Component Value Date/Time    CHOL 236 05/21/2015 08:22 AM    TRIG 97 05/21/2015 08:22 AM    HDL 56 05/21/2015 08:22 AM    LDLCALC 161 05/21/2015 08:22 AM     Last PSA: No results found for: PSA  Last Thyroid: No results found for: TSH, FT3, S4ZGBPV, T4FREE, S5QODZK

## 2022-10-01 DIAGNOSIS — M81.0 OSTEOPOROSIS, POSTMENOPAUSAL: Primary | ICD-10-CM

## 2022-10-03 RX ORDER — ALENDRONATE SODIUM 70 MG/1
TABLET ORAL
Qty: 12 TABLET | Refills: 4 | Status: SHIPPED | OUTPATIENT
Start: 2022-10-03

## 2022-10-03 NOTE — TELEPHONE ENCOUNTER
Medication:   Requested Prescriptions     Pending Prescriptions Disp Refills    alendronate (FOSAMAX) 70 MG tablet [Pharmacy Med Name: ALENDRONATE SODIUM 70 MG TAB] 12 tablet 0     Sig: TAKE 1 TABLET BY MOUTH ONE TIME PER WEEK       Last Filled:      Patient Phone Number: [phone] (home) [phone] (work)    Last appt: 10/12/2021   Next appt: 10/25/22    Last Labs DM: No results found for: LABA1C  Last Lipid:   Lab Results   Component Value Date/Time    CHOL 236 05/21/2015 08:22 AM    TRIG 97 05/21/2015 08:22 AM    HDL 56 05/21/2015 08:22 AM    LDLCALC 161 05/21/2015 08:22 AM     Last PSA: No results found for: PSA  Last Thyroid: No results found for: TSH, FT3, E1WKSZA, T4FREE, W8BQCXO

## 2022-10-25 ENCOUNTER — HOSPITAL ENCOUNTER (OUTPATIENT)
Dept: GENERAL RADIOLOGY | Age: 87
Discharge: HOME | End: 2022-10-25
Payer: MEDICARE

## 2022-10-25 ENCOUNTER — PROCEDURE VISIT (OUTPATIENT)
Dept: ENDOCRINOLOGY | Age: 87
End: 2022-10-25

## 2022-10-25 ENCOUNTER — OFFICE VISIT (OUTPATIENT)
Dept: ENDOCRINOLOGY | Age: 87
End: 2022-10-25
Payer: MEDICARE

## 2022-10-25 VITALS
DIASTOLIC BLOOD PRESSURE: 71 MMHG | HEIGHT: 60 IN | WEIGHT: 107.4 LBS | BODY MASS INDEX: 21.09 KG/M2 | SYSTOLIC BLOOD PRESSURE: 139 MMHG

## 2022-10-25 DIAGNOSIS — Z51.81 MEDICATION MONITORING ENCOUNTER: ICD-10-CM

## 2022-10-25 DIAGNOSIS — E55.9 VITAMIN D DEFICIENCY: ICD-10-CM

## 2022-10-25 DIAGNOSIS — M81.0 OSTEOPOROSIS, POSTMENOPAUSAL: Primary | Chronic | ICD-10-CM

## 2022-10-25 DIAGNOSIS — M81.0 OSTEOPOROSIS, POSTMENOPAUSAL: Primary | ICD-10-CM

## 2022-10-25 DIAGNOSIS — M81.0 OSTEOPOROSIS, POSTMENOPAUSAL: ICD-10-CM

## 2022-10-25 DIAGNOSIS — N18.30 STAGE 3 CHRONIC KIDNEY DISEASE, UNSPECIFIED WHETHER STAGE 3A OR 3B CKD (HCC): ICD-10-CM

## 2022-10-25 PROCEDURE — 77080 DXA BONE DENSITY AXIAL: CPT | Performed by: INTERNAL MEDICINE

## 2022-10-25 PROCEDURE — 1123F ACP DISCUSS/DSCN MKR DOCD: CPT | Performed by: INTERNAL MEDICINE

## 2022-10-25 PROCEDURE — 99215 OFFICE O/P EST HI 40 MIN: CPT | Performed by: INTERNAL MEDICINE

## 2022-10-25 PROCEDURE — 77080 DXA BONE DENSITY AXIAL: CPT

## 2022-10-25 RX ORDER — DENOSUMAB 60 MG/ML
60 INJECTION SUBCUTANEOUS ONCE
Qty: 1 ML | Refills: 0 | Status: SHIPPED | OUTPATIENT
Start: 2022-10-25 | End: 2022-10-25

## 2022-10-25 RX ORDER — GABAPENTIN 100 MG/1
CAPSULE ORAL
COMMUNITY
Start: 2022-10-10

## 2022-10-25 NOTE — RESULT ENCOUNTER NOTE
[facility]) Osteoporosis and [address] [facility] [address] [facility], [address] [facility],[place] [address]  Phone [phone]  Fax [phone]    NAME: Eugenia Randall   : 1935   STUDY DATE: 10/25/2022     REFERRING PROVIDER: Reza Bess MD     INDICATION(S) FOR PERFORMING THE STUDY:  osteoporosis, age-related (M81.0)    CLINICAL INFORMATION PROVIDED BY THE PATIENT: 67-year-old woman. She underwent surgical menopause at age 48. She took estrogen until . No history of fragility fractures. No long-term corticosteroid use. She took alendronate 2011-2016, restarted 2017. EQUIPMENT: [employer]. POSITIONING: Good. REGIONS OF INTEREST: Correct. ARTIFACTS: None. STUDY VALID? Yes. Spine BMD is spuriously high because of generalized degenerative change. L1 and L4 were deleted due to T-score discrepancy. T-scores  Initial study: 2003 L1-L4 -2.2 (left fem. neck) -1.9   Current study: 10/25/2022 L2-L3 -3.6 (left fem. neck) -2.4     The table below shows bone mineral density (grams/cm2), the appropriate measure for comparing serial scans. An increase or decrease is significant based on precision studies done at our center according to the ISCD protocol. PA spine Proximal Femur (left)   Date L2-L3 Fem. neck Trochanter Total hip   2003 --- 0.642 0.562 0.791   2010 --- 0.631 --- 0.798   2011 --- 0.579 0.452 0.680   2012 0.768 0.623 0.465 0.692   2015 0.752 0.607 0.485 0.713   2016 0.734 0.613 0.470 0.713   2017 0.683 0.606 0.468 0.700   2019 0.682 0.603 0.475 0.700   09/15/2020 0.714 0.606 0.468 0.707   10/12/2021 0.769 0.585 0.436 0.682   10/25/2022 0.658 0.585 0.449 0.687     IMPRESSION:  BONE DENSITY IS LOW, CONSISTENT WITH OSTEOPOROSIS. SINCE THE PREVIOUS DXA, BMD DID NOT CHANGE SIGNIFICANTLY IN THE LEFT HIP. IT IS MUCH LOWER IN THE SPINE.   EVEN IF THE  SPINE VALUE WAS SPURIOUSLY HIGH THIS IS LOWER THAN IT WAS IN 2020. Consider repeating this study in 1-2 years to assess the patient's progress. _________________________________________________   Ashwin Mercado MD, Director, [address] [facility] and [address] [place] [address]

## 2022-10-25 NOTE — PROGRESS NOTES
[facility]) Osteoporosis and [address] [place] [address]   Phone [phone]  Fax [phone]    NAME: Markell Tinajero OF BIRTH: 1935  INITIAL CONSULTATION: 06/09/2010  LAST OFFICE VISIT: 10/12/2021  TODAY'S VISIT: 10/25/2022    [facility]    PROBLEMS:   Osteoporosis by DXA, lowest T-score -2.5 in the spine 02/2010    Family history of osteoporosis (brother fractured hip)    BMD decreased 6537-2206  Vitamin D deficiency, desirable 25-OH D is 30 to 60 ng/mL    25 ng/mL 04/2010    67 ng/mL 06/2010 with ergocalciferol 50,000 IU weekly    42 ng/mL 10/2010 with cholecalciferol 2000 IU daily    36 ng/mL 10/2014 with cholecalciferol 2000 IU daily    76 ng/mL 05/2016    35 ng/mL 05/2020  Reduced kidney function, 03/2017 Cr 1.1 GFR 45. 06/2019 Cr 1.1 GFR 46. 06/2021 Cr 1.0 GFR 54.    04/2022 Cr 1.0 GFR 55. 09/2022 Cr 1.1 GFR 55. Surgical menopause 13 (age 48)  Bai's esophagus/GERD  Scoliosis  Osteoarthritis  Hypercalcemia. 01/2019, 10.7.  10/2021 11.0. 11/2021 Ca 10.5 (ULN 10.4) PTH 65 (15-65). 02/2023 11.0.    CURRENT MANAGEMENT FOR BONE HEALTH:   Calcium:  900 mg diet + 600 mg/d supplement = 1500 mg/d  Vitamin D:  1000 IU 3 x weekly + 800 IU/d with calcium supplement   Exercise:  works in her garden and tries to walk daily  Pharmacologic therapy:  alendronate 70 mg weekly 11/2011-08/2016, stopped for a holiday, restarted 08/2017    PREVIOUS MEDICATIONS FOR OSTEOPOROSIS: Estrogen 7235-2833    OTHER CURRENT MEDICATIONS (SELECTED): Welchol    CHIEF COMPLAINT: Here for f/u visit of osteoporosis and vitamin D deficiency, monitoring treatment. No new related signs or symptoms. PAST MEDICAL HISTORY, FAMILY HISTORY, SOCIAL HISTORY AND REVIEW OF SYSTEMS:  Relevant changes since last visit (see patient questionnaire of today's date). INTERVAL HISTORY. See problem list for active/ongoing issues.   She has been taking alendronate correctly and without side effects. No falls, near falls or fractures. She feels well overall. NEUROLOGIC EXAM: Able to rise from chair without using arms. No apparent focal motor or sensory deficit. Reflexes brisk and symmetric. Coordination appears normal.  MUSCULOSKELETAL EXAM: Gait: Intact without difficulty. Steady without assistance. Spine: Scoliosis. No spine tenderness to palpation or percussion. Ribs and pelvis: Ribs appear normal. Two finger spaces between ribs and pelvis. BONE DENSITY: Most recent done here using Hologic equipment. T-scores  Initial study: 09/23/2003 L1-L4 -2.2 (left fem. neck) -1.9   Current study: 10/25/2022 L2-L3 -3.6 (left fem. neck) -2.4     The table below shows bone mineral density (grams/cm2), the appropriate measure for comparing serial scans. An increase or decrease is significant based on precision studies done at our center according to the ISCD protocol. PA spine Proximal Femur (left)   Date L2-L3 Fem. neck Trochanter Total hip   09/23/2003 --- 0.642 0.562 0.791   08/06/2010 --- 0.631 --- 0.798   11/14/2011 --- 0.579 0.452 0.680   12/04/2012 0.768 0.623 0.465 0.692   01/06/2015 0.752 0.607 0.485 0.713   07/26/2016 0.734 0.613 0.470 0.713   08/14/2017 0.683 0.606 0.468 0.700   09/03/2019 0.682 0.603 0.475 0.700   09/15/2020 0.714 0.606 0.468 0.707   10/12/2021 0.769 0.585 0.436 0.682   10/25/2022 0.658 0.585 0.449 0.687     IMPRESSION:  BONE DENSITY IS LOW, CONSISTENT WITH OSTEOPOROSIS. SINCE THE PREVIOUS DXA, BMD DID NOT CHANGE SIGNIFICANTLY IN THE LEFT HIP. IT IS MUCH LOWER IN THE SPINE. EVEN IF THE 2021 SPINE VALUE WAS SPURIOUSLY HIGH THIS IS LOWER THAN IT WAS IN 2020. LABS. 05/2015 Ca 9.6 Cr 1.0 GFR 54.  04/2016 Ca 10.0 Cr 1.1 GFR 46. 03/2017 Ca 10.4 Cr 1.1 GFR 46.  06/2019 Ca 10.0, Cr 1.1 GFR 46. 05/2020 Ca 9.6 Cr 0.9. 06/2021 Ca 10.0 Cr 1.0 GFR 54.   04/2022 Ca 10.2 Cr 1.1 GFR 55. IMAGING. DXA printouts reviewed. 05/2020 L hip XR. 08/2022 LS XR.    ASSESSMENT: Osteoporosis, bone density lower than desirable. She had a nice response to alendronate 1176-0450. BMD decreased in the spine 6636-6813, off treatment for a holiday.   She was doing well with alendronate re-started 08/2017 but spine BMD appears to have decreased 5024-5171    Hypercalcemia since 2019, inappropriate PTH. Almost certainly primary hyperparathyroidism but mos recent calcium levels have been normal.     PLANS:   I recommended changing from alendronate to Prolia and she agreed. We will make the necessary arrangements. Return in 1 year with DXA. Time spent today: 40-59 minutes. Claude Mile Watts MD, Director, [facility]) Osteoporosis and Bone Health Services    CC: Deo Stevia First DO

## 2022-11-01 ENCOUNTER — TELEPHONE (OUTPATIENT)
Dept: ENDOCRINOLOGY | Age: 87
End: 2022-11-01

## 2023-12-16 DIAGNOSIS — M81.0 OSTEOPOROSIS, POSTMENOPAUSAL: ICD-10-CM

## 2023-12-19 RX ORDER — ALENDRONATE SODIUM 70 MG/1
TABLET ORAL
Qty: 12 TABLET | Refills: 1 | OUTPATIENT
Start: 2023-12-19

## 2023-12-19 NOTE — TELEPHONE ENCOUNTER
Medication:   Requested Prescriptions     Pending Prescriptions Disp Refills    alendronate (FOSAMAX) 70 MG tablet [Pharmacy Med Name: ALENDRONATE SODIUM 70 MG TAB] 12 tablet 1     Sig: TAKE 1 TABLET BY MOUTH ONE TIME PER WEEK       Last Filled:      Patient Phone Number: [phone] (home) [phone] (work)    Last appt: 10/25/2022   Next appt: no future appointment scheduled    Last Labs DM: No results found for: \"LABA1C\"  Last Lipid:   Lab Results   Component Value Date/Time    CHOL 236 05/21/2015 08:22 AM    TRIG 97 05/21/2015 08:22 AM    HDL 56 05/21/2015 08:22 AM    LDLCALC 161 05/21/2015 08:22 AM     Last PSA: No results found for: \"PSA\"  Last Thyroid: No results found for: \"TSH\", \"FT3\", \"F4CCTYK\", \"T4FREE\", \"F6YREER\"

## 2024-01-17 DIAGNOSIS — M81.0 OSTEOPOROSIS, POSTMENOPAUSAL: ICD-10-CM

## 2024-01-17 NOTE — TELEPHONE ENCOUNTER
Call from Kevin stating they are the pt's new pharmacy and will need a rx sent over for a refill on Alendronate 70mg tab    [facility] on [address] # [phone]

## 2024-01-17 NOTE — TELEPHONE ENCOUNTER
Medication:   Requested Prescriptions     Pending Prescriptions Disp Refills    alendronate (FOSAMAX) 70 MG tablet 12 tablet 4     Sig: TAKE 1 TABLET BY MOUTH ONE TIME PER WEEK     Patient has new pharmacy  Last Filled:      Patient Phone Number: [phone] (home) [phone] (work)    Last appt: 10/25/2022   Next appt: Visit date not found    Last Labs DM: No results found for: \"LABA1C\"  Last Lipid:   Lab Results   Component Value Date/Time    CHOL 236 05/21/2015 08:22 AM    TRIG 97 05/21/2015 08:22 AM    HDL 56 05/21/2015 08:22 AM    LDLCALC 161 05/21/2015 08:22 AM     Last PSA: No results found for: \"PSA\"  Last Thyroid: No results found for: \"TSH\", \"FT3\", \"[id number]\", \"T4FREE\", \"[id number]\"

## 2024-01-18 RX ORDER — ALENDRONATE SODIUM 70 MG/1
TABLET ORAL
Qty: 12 TABLET | Refills: 4 | OUTPATIENT
Start: 2024-01-18

## 2024-01-22 RX ORDER — ALENDRONATE SODIUM 70 MG/1
TABLET ORAL
Qty: 4 TABLET | OUTPATIENT
Start: 2024-01-22